# Patient Record
Sex: MALE | Race: WHITE
[De-identification: names, ages, dates, MRNs, and addresses within clinical notes are randomized per-mention and may not be internally consistent; named-entity substitution may affect disease eponyms.]

---

## 2022-05-11 ENCOUNTER — HOSPITAL ENCOUNTER (EMERGENCY)
Dept: HOSPITAL 75 - ER FS | Age: 37
Discharge: LEFT BEFORE DISCHARGE | End: 2022-05-11
Payer: SELF-PAY

## 2022-05-11 VITALS — BODY MASS INDEX: 25.62 KG/M2 | WEIGHT: 182.98 LBS | HEIGHT: 70.87 IN

## 2022-05-11 VITALS — DIASTOLIC BLOOD PRESSURE: 81 MMHG | SYSTOLIC BLOOD PRESSURE: 137 MMHG
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
## 2022-05-11 DIAGNOSIS — F17.210: ICD-10-CM

## 2022-05-11 DIAGNOSIS — R07.89: ICD-10-CM

## 2022-05-11 DIAGNOSIS — T67.5XXA: Primary | ICD-10-CM

## 2022-05-11 LAB
ALBUMIN SERPL-MCNC: 4.7 GM/DL (ref 3.2–4.5)
ALP SERPL-CCNC: 66 U/L (ref 40–136)
ALT SERPL-CCNC: 15 U/L (ref 0–55)
APTT BLD: 27 SEC (ref 24–35)
BASOPHILS # BLD AUTO: 0.1 10^3/UL (ref 0–0.1)
BASOPHILS NFR BLD AUTO: 1 % (ref 0–10)
BILIRUB SERPL-MCNC: 0.6 MG/DL (ref 0.1–1)
BUN/CREAT SERPL: 22
CALCIUM SERPL-MCNC: 9.8 MG/DL (ref 8.5–10.1)
CHLORIDE SERPL-SCNC: 100 MMOL/L (ref 98–107)
CO2 SERPL-SCNC: 21 MMOL/L (ref 21–32)
CREAT SERPL-MCNC: 1.03 MG/DL (ref 0.6–1.3)
EOSINOPHIL # BLD AUTO: 0.2 10^3/UL (ref 0–0.3)
EOSINOPHIL NFR BLD AUTO: 2 % (ref 0–10)
GFR SERPLBLD BASED ON 1.73 SQ M-ARVRAT: 97 ML/MIN
GLUCOSE SERPL-MCNC: 103 MG/DL (ref 70–105)
HCT VFR BLD CALC: 43 % (ref 40–54)
HGB BLD-MCNC: 15.4 G/DL (ref 13.3–17.7)
INR PPP: 1 (ref 0.8–1.4)
LIPASE SERPL-CCNC: 19 U/L (ref 8–78)
LYMPHOCYTES # BLD AUTO: 1.8 10^3/UL (ref 1–4)
LYMPHOCYTES NFR BLD AUTO: 22 % (ref 12–44)
MAGNESIUM SERPL-MCNC: 1.7 MG/DL (ref 1.6–2.4)
MANUAL DIFFERENTIAL PERFORMED BLD QL: NO
MCH RBC QN AUTO: 30 PG (ref 25–34)
MCHC RBC AUTO-ENTMCNC: 36 G/DL (ref 32–36)
MCV RBC AUTO: 85 FL (ref 80–99)
MONOCYTES # BLD AUTO: 0.7 10^3/UL (ref 0–1)
MONOCYTES NFR BLD AUTO: 8 % (ref 0–12)
NEUTROPHILS # BLD AUTO: 5.4 10^3/UL (ref 1.8–7.8)
NEUTROPHILS NFR BLD AUTO: 67 % (ref 42–75)
PLATELET # BLD: 293 10^3/UL (ref 130–400)
PMV BLD AUTO: 9.5 FL (ref 9–12.2)
POTASSIUM SERPL-SCNC: 3.7 MMOL/L (ref 3.6–5)
PROT SERPL-MCNC: 7.2 GM/DL (ref 6.4–8.2)
PROTHROMBIN TIME: 13.2 SEC (ref 12.2–14.7)
SODIUM SERPL-SCNC: 135 MMOL/L (ref 135–145)
WBC # BLD AUTO: 8.1 10^3/UL (ref 4.3–11)

## 2022-05-11 PROCEDURE — 93041 RHYTHM ECG TRACING: CPT

## 2022-05-11 PROCEDURE — 80053 COMPREHEN METABOLIC PANEL: CPT

## 2022-05-11 PROCEDURE — 85730 THROMBOPLASTIN TIME PARTIAL: CPT

## 2022-05-11 PROCEDURE — 85610 PROTHROMBIN TIME: CPT

## 2022-05-11 PROCEDURE — 84484 ASSAY OF TROPONIN QUANT: CPT

## 2022-05-11 PROCEDURE — 83735 ASSAY OF MAGNESIUM: CPT

## 2022-05-11 PROCEDURE — 93005 ELECTROCARDIOGRAM TRACING: CPT

## 2022-05-11 PROCEDURE — 36415 COLL VENOUS BLD VENIPUNCTURE: CPT

## 2022-05-11 PROCEDURE — 83690 ASSAY OF LIPASE: CPT

## 2022-05-11 PROCEDURE — 83874 ASSAY OF MYOGLOBIN: CPT

## 2022-05-11 PROCEDURE — 85025 COMPLETE CBC W/AUTO DIFF WBC: CPT

## 2022-05-11 PROCEDURE — 71045 X-RAY EXAM CHEST 1 VIEW: CPT

## 2022-05-11 NOTE — DIAGNOSTIC IMAGING REPORT
EXAMINATION: Chest 1 view



HISTORY: Chest pain. Heat exhaustion.



COMPARISON: None available.



FINDINGS: 



The lung volumes are normal. No focal consolidation is seen. No

large pleural effusion or pneumothorax is seen. The

cardiomediastinal silhouette is normal in size and contour. No

acute osseous abnormality is seen.



IMPRESSION: 



1. No acute pleuroparenchymal process.



Dictated by: 



  Dictated on workstation # [id number]

## 2022-05-11 NOTE — ED CHEST PAIN
General


Stated Complaint:  CHEST PAIN; HEAT EXPOSURE


Source:  patient


Exam Limitations:  no limitations





History of Present Illness


Date Seen by Provider:  May 11, 2022


Time Seen by Provider:  12:10


Initial Comments


Patient presents ER by private conveyance with significant other chief complaint

that he was at work outside today and started getting some belly pain nausea 

sweating and for a bottle water and himself because he thought he was having 

heat exhaustion.  He is never had that before.  He is never had any heart 

disease.  He does have hypertension but does not take any medicines for it nor 

follow-up with a doctor.  He is a daily smoker and uses some cannabis and states

he occasionally uses stimulants.  He has not urinated much today.  He does not 

have a history of GERD but he does have a pain in his chest pointing to his 

epigastric region burns and radiates upwards and he was starting to have some 

tingling in his left arm and hand.  No primary family early onset coronary 

disease.  No diabetes or hyperlipidemia.





Allergies and Home Medications


Allergies


Coded Allergies:  


     No Known Drug Allergies (Unverified , 22)





Patient Home Medication List


Home Medication List Reviewed:  Yes





Review of Systems


Review of Systems


Constitutional:  No chills, No diaphoresis


EENTM:  No Blurred Vision, No Double Vision


Respiratory:  Denies Cough, Denies Shortness of Air


Cardiovascular:  See HPI, Chest Pain; Denies Edema; Lightheadedness


Gastrointestinal:  See HPI; Denies Abdomen Distended; Abdominal Pain; Denies 

Constipated, Denies Diarrhea; Nausea; Denies Poor Fluid Intake, Denies Vomiting


Genitourinary:  Denies Burning, Denies Discharge


Musculoskeletal:  No back pain, No joint pain





All Other Systems Reviewed


Negative Unless Noted:  Yes





Past Medical-Social-Family Hx


Patient Social History


Tobacco Use?:  Yes


Tobacco type used:  Cigarettes


Use of E-Cig and/or Vaping dev:  No


Substance use?:  Yes





Physical Exam


Vital Signs





Vital Signs - First Documented








 22





 12:13


 


Temp 36.7


 


Pulse 83


 


Resp 20


 


B/P (MAP) 146/72 (96)


 


Pulse Ox 100


 


O2 Delivery Room Air





Capillary Refill :


Height, Weight, BMI


Height: '"


Weight: lbs. oz. kg;  BMI


Method:


General Appearance:  WD/WN, Mild Distress


HEENT:  PERRL/EOMI, Normal ENT Inspection; No Moist Mucous Membranes (Dry oral 

mucosa without lesion or)


Neck:  Full Range of Motion, Normal Inspection, Non Tender


Respiratory:  Lungs Clear, Normal Breath Sounds, No Accessory Muscle Use, No 

Respiratory Distress


Cardiovascular:  Regular Rate, Rhythm, No Edema, Normal Peripheral Pulses


Gastrointestinal:  Normal Bowel Sounds (Quiescent), No Organomegaly, Non Tender,

Soft


Extremity:  Normal Capillary Refill, Normal Inspection, Normal Range of Motion, 

Non Tender


Neurologic/Psychiatric:  Alert, Oriented x3


Skin:  Rash (Erythematous blanchable skin, warm and dry)





Progress/Results/Core Measures


Results/Orders


Lab Results





Laboratory Tests








Test


 22


12:18 Range/Units


 


 


White Blood Count


 8.1 


 4.3-11.0


10^3/uL


 


Red Blood Count


 5.06 


 4.30-5.52


10^6/uL


 


Hemoglobin 15.4  13.3-17.7  g/dL


 


Hematocrit 43  40-54  %


 


Mean Corpuscular Volume 85  80-99  fL


 


Mean Corpuscular Hemoglobin 30  25-34  pg


 


Mean Corpuscular Hemoglobin


Concent 36 


 32-36  g/dL





 


Red Cell Distribution Width 11.9  10.0-14.5  %


 


Platelet Count


 293 


 130-400


10^3/uL


 


Mean Platelet Volume 9.5  9.0-12.2  fL


 


Immature Granulocyte % (Auto) 0   %


 


Neutrophils (%) (Auto) 67  42-75  %


 


Lymphocytes (%) (Auto) 22  12-44  %


 


Monocytes (%) (Auto) 8  0-12  %


 


Eosinophils (%) (Auto) 2  0-10  %


 


Basophils (%) (Auto) 1  0-10  %


 


Neutrophils # (Auto)


 5.4 


 1.8-7.8


10^3/uL


 


Lymphocytes # (Auto)


 1.8 


 1.0-4.0


10^3/uL


 


Monocytes # (Auto)


 0.7 


 0.0-1.0


10^3/uL


 


Eosinophils # (Auto)


 0.2 


 0.0-0.3


10^3/uL


 


Basophils # (Auto)


 0.1 


 0.0-0.1


10^3/uL


 


Immature Granulocyte # (Auto)


 0.0 


 0.0-0.1


10^3/uL


 


Prothrombin Time 13.2  12.2-14.7  SEC


 


INR Comment 1.0  0.8-1.4  


 


Activated Partial


Thromboplast Time 27 


 24-35  SEC





 


Sodium Level 135  135-145  MMOL/L


 


Potassium Level 3.7  3.6-5.0  MMOL/L


 


Chloride Level 100    MMOL/L


 


Carbon Dioxide Level 21  21-32  MMOL/L


 


Anion Gap 14  5-14  MMOL/L


 


Blood Urea Nitrogen 23 H 7-18  MG/DL


 


Creatinine


 1.03 


 0.60-1.30


MG/DL


 


Estimat Glomerular Filtration


Rate 97 


  





 


BUN/Creatinine Ratio 22   


 


Glucose Level 103    MG/DL


 


Calcium Level 9.8  8.5-10.1  MG/DL


 


Corrected Calcium   8.5-10.1  MG/DL


 


Magnesium Level 1.7  1.6-2.4  MG/DL


 


Total Bilirubin 0.6  0.1-1.0  MG/DL


 


Aspartate Amino Transf


(AST/SGOT) 16 


 5-34  U/L





 


Alanine Aminotransferase


(ALT/SGPT) 15 


 0-55  U/L





 


Alkaline Phosphatase 66    U/L


 


Myoglobin


 38.4 


 10.0-92.0


NG/ML


 


Troponin I < 0.30  <0.30  NG/ML


 


Total Protein 7.2  6.4-8.2  GM/DL


 


Albumin 4.7 H 3.2-4.5  GM/DL


 


Lipase 19  8-78  U/L








My Orders





Orders - YFN DWYER


Cbc With Automated Diff (22 12:16)


Magnesium (22 12:16)


Chest 1 View Ap/Pa Only (22 12:16)


Ekg Tracing (22 12:16)


Comprehensive Metabolic Panel (22 12:16)


Myoglobin Serum (22 12:16)


Protime With Inr (22 12:16)


Partial Thromboplastin Time (22 12:16)


O2 (22 12:16)


Monitor-Rhythm Ecg Trace Only (22 12:16)


Lipid Panel (22 06:00)


Aspirin Chewable Tablet (Baby Aspirin Ch (22 12:30)


Ed Iv/Invasive Line Start (22 12:16)


Lipase (22 12:16)


Troponin I Fs (22 12:16)


Ed Iv/Invasive Line Start (22 12:16)


Lactated Ringers (Lr 1000 Ml Iv Solution (22 12:30)


Lactated Ringers (Lr 1000 Ml Iv Solution (22 12:30)


Pantoprazole Injection (Protonix Injecti (22 12:30)


Ondansetron Injection (Zofran Injectio (22 12:30)





Medications Given in ED





Current Medications








 Medications  Dose


 Ordered  Sig/Renetta


 Route  Start Time


 Stop Time Status Last Admin


Dose Admin


 


 Aspirin  324 mg  ONCE  ONCE


 PO  22 12:30


 22 12:31 DC 22 12:24


324 MG


 


 Lactated Ringer's  1,000 ml @ 


 0 mls/hr  Q0M ONCE


 IV  22 12:30


 22 12:31 DC 22 12:24


999 MLS/HR


 


 Lactated Ringer's  1,000 ml @ 


 0 mls/hr  Q0M ONCE


 IV  22 12:30


 22 12:31 DC 22 13:25


1,000 MLS/HR


 


 Ondansetron HCl  8 mg  ONCE  ONCE


 IVP  22 12:30


 22 12:31 DC 22 12:24


8 MG


 


 Pantoprazole  40 mg  ONCE  ONCE


 IV  22 12:30


 22 12:31 DC 22 12:24


40 MG








Vital Signs/I&O











 22





 12:13


 


Temp 36.7


 


Pulse 83


 


Resp 20


 


B/P (MAP) 146/72 (96)


 


Pulse Ox 100


 


O2 Delivery Room Air











Progress


Progress Note #1:  


   Time:  12:21


Progress Note


Despite pouring water on himself he has no sweat on his forehead, arms or 

elsewhere.  He does look like he had a heat injury.  He is neurologically 

intact.  His vital signs are okay with a heart rate in the 60s and a good blood 

pressure of 146/72 on presentation.  He is not having any difficulty breathing. 

We will give him some aspirin and work him up for his heart however we will 

continue to treat his evident heat exhaustion with a couple liters of fluid.  

Chest x-ray.  Electrolytes and labs.  Pantoprazole for his burning sensation and

ondansetron for his nausea.  We will try GI cocktail after his nausea is under 

control.


Progress Note #2:  


   Time:  13:17


Progress Note


After the pantoprazole he says his burning pain is gone.  His nausea is gone and

he is feeling much better.  Again we will let him have another liter of fluids 

as he has not even had the urge to urinate yet.  Will encourage him to follow-up

with cardiology outpatient in a couple weeks if his delta troponin is negative.


1 points HEART Pathway Score. Low risk; 0.9-1.7% 30-day MACE


Progress Note #3:  


   Time:  13:58


Progress Note


Patient states his symptoms are gone he is feeling much better and he would like

to go home.  He was explained that we would not be able to rule out significant 

coronary disease without a second troponin but he does not want to wait any 

longer.  He was taken down to the bathroom to provide a urine specimen for drug 

screen for his work and came back stating he declined to do this and they will 

just wants to go home.  We did discuss risks benefits and alternatives to 

leaving at this time and encouraged him to follow-up with a cardiologist for an 

outpatient work-up.  We provided him with an opportunity for some nausea medici

corrie to be sent to the pharmacy which he declined.  He has gotten his total 2 L 

of fluids.  We did reinforce the plan for him to take it easy for the next 

couple days and drink plenty of fluids.  Return precautions were discussed.


Initial ECG Impression Date:  May 11, 2022


Initial ECG Impression Time:  12:14


Initial ECG Rate:  74


Initial ECG Rhythm:  Normal Sinus


Initial ECG Intervals:  Normal


Initial ECG Impression:  Normal


Initial ECG Comparisson:  No Previous ECG Available


Comment


Normal sinus rhythm without clinically relevant ST elevation or depression





Diagnostic Imaging





   Diagonstic Imaging:  Xray


   Plain Films/CT/US/NM/MRI:  chest


Comments


                 [facility] [place], [place]





NAME:   OLENA VANG


[facility] REC#:   [id number]


ACCOUNT#:   [id number]


PT STATUS:   REG ER


:   1985


PHYSICIAN:   YFN DWYER MD


ADMIT DATE:   22/ER FS


                                  ***Signed***


Date of Exam:22





CHEST 1 VIEW AP/PA ONLY








EXAMINATION: Chest 1 view





HISTORY: Chest pain. Heat exhaustion.





COMPARISON: None available.





FINDINGS: 





The lung volumes are normal. No focal consolidation is seen. No


large pleural effusion or pneumothorax is seen. The


cardiomediastinal silhouette is normal in size and contour. No


acute osseous abnormality is seen.





IMPRESSION: 





1. No acute pleuroparenchymal process.





Dictated by: 





  Dictated on workstation # [id number]








Dict:   22 1244


Trans:   22 1249


 [id number]





Interpreted by:     EM VARMA DO


Electronically signed by: EM VARMA DO 22 1249


   Reviewed:  Reviewed by Me





Departure


Impression





   Primary Impression:  


   Heat exhaustion


   Qualified Codes:  T67.5XXA - Heat exhaustion, unspecified, initial encounter


   Additional Impression:  


   Chest pain


   Qualified Codes:  R07.9 - Chest pain, unspecified


Disposition:  07 AGAINST MEDICAL ADVICE


Condition:  Against Medical Advice





Departure-Patient Inst.


Decision time for Depature:  13:59


Referrals:  


[facility]





NO,LOCAL PHYSICIAN (PCP)


Primary Care Physician


Patient Instructions:  Heat Exhaustion and Heat Stroke (DC)





Add. Discharge Instructions:  


Drink plenty of fluids.  Sports drink such as Gatorade or Powerade are 

recommended.


For the next 2 to 3 days you should stay in and take it easy while your body 

recovers.


Follow-up in 1 to 2 weeks with a cardiologist, Dr. Piedra by calling his clinic 

for an appointment to further evaluate the pain you are feeling in your chest.


Return to the ER at anytime for further management of symptoms especially if 

they get worse or you develop more chest pain, shortness of air, or other 

worrisome symptoms.


Work/School Note:  Work Release Form   Date Seen in the Emergency Department:  

May 11, 2022


   Return to Work:  May 16, 2022


   Restrictions:  No Restrictions





Copy


Copies To 1:   [facility]











YFN DWYER                 May 11, 2022 12:23

## 2022-05-28 ENCOUNTER — HOSPITAL ENCOUNTER (EMERGENCY)
Dept: HOSPITAL 75 - ER | Age: 37
Discharge: HOME | End: 2022-05-28
Payer: SELF-PAY

## 2022-05-28 VITALS — WEIGHT: 179.9 LBS | HEIGHT: 70 IN | BODY MASS INDEX: 25.75 KG/M2

## 2022-05-28 VITALS — DIASTOLIC BLOOD PRESSURE: 93 MMHG | SYSTOLIC BLOOD PRESSURE: 127 MMHG

## 2022-05-28 DIAGNOSIS — R00.0: ICD-10-CM

## 2022-05-28 DIAGNOSIS — R07.89: Primary | ICD-10-CM

## 2022-05-28 DIAGNOSIS — Z87.891: ICD-10-CM

## 2022-05-28 LAB
ALBUMIN SERPL-MCNC: 4.8 GM/DL (ref 3.2–4.5)
ALP SERPL-CCNC: 70 U/L (ref 40–136)
ALT SERPL-CCNC: 26 U/L (ref 0–55)
APTT BLD: 29 SEC (ref 24–35)
BASOPHILS # BLD AUTO: 0.1 10^3/UL (ref 0–0.1)
BASOPHILS NFR BLD AUTO: 1 % (ref 0–10)
BILIRUB SERPL-MCNC: 0.5 MG/DL (ref 0.1–1)
BUN/CREAT SERPL: 17
CALCIUM SERPL-MCNC: 9.9 MG/DL (ref 8.5–10.1)
CHLORIDE SERPL-SCNC: 102 MMOL/L (ref 98–107)
CO2 SERPL-SCNC: 20 MMOL/L (ref 21–32)
CREAT SERPL-MCNC: 1.03 MG/DL (ref 0.6–1.3)
EOSINOPHIL # BLD AUTO: 0.2 10^3/UL (ref 0–0.3)
EOSINOPHIL NFR BLD AUTO: 2 % (ref 0–10)
GFR SERPLBLD BASED ON 1.73 SQ M-ARVRAT: 97 ML/MIN
GLUCOSE SERPL-MCNC: 141 MG/DL (ref 70–105)
HCT VFR BLD CALC: 46 % (ref 40–54)
HGB BLD-MCNC: 16.4 G/DL (ref 13.3–17.7)
INR PPP: 0.9 (ref 0.8–1.4)
LYMPHOCYTES # BLD AUTO: 2.1 10^3/UL (ref 1–4)
LYMPHOCYTES NFR BLD AUTO: 21 % (ref 12–44)
MAGNESIUM SERPL-MCNC: 2.1 MG/DL (ref 1.6–2.4)
MANUAL DIFFERENTIAL PERFORMED BLD QL: NO
MCH RBC QN AUTO: 30 PG (ref 25–34)
MCHC RBC AUTO-ENTMCNC: 36 G/DL (ref 32–36)
MCV RBC AUTO: 86 FL (ref 80–99)
MONOCYTES # BLD AUTO: 0.7 10^3/UL (ref 0–1)
MONOCYTES NFR BLD AUTO: 7 % (ref 0–12)
NEUTROPHILS # BLD AUTO: 7.1 10^3/UL (ref 1.8–7.8)
NEUTROPHILS NFR BLD AUTO: 69 % (ref 42–75)
PLATELET # BLD: 382 10^3/UL (ref 130–400)
PMV BLD AUTO: 9.3 FL (ref 9–12.2)
POTASSIUM SERPL-SCNC: 3.8 MMOL/L (ref 3.6–5)
PROT SERPL-MCNC: 7.8 GM/DL (ref 6.4–8.2)
PROTHROMBIN TIME: 12.9 SEC (ref 12.2–14.7)
SODIUM SERPL-SCNC: 139 MMOL/L (ref 135–145)
WBC # BLD AUTO: 10.3 10^3/UL (ref 4.3–11)

## 2022-05-28 PROCEDURE — 93041 RHYTHM ECG TRACING: CPT

## 2022-05-28 PROCEDURE — 85730 THROMBOPLASTIN TIME PARTIAL: CPT

## 2022-05-28 PROCEDURE — 83874 ASSAY OF MYOGLOBIN: CPT

## 2022-05-28 PROCEDURE — 36415 COLL VENOUS BLD VENIPUNCTURE: CPT

## 2022-05-28 PROCEDURE — 99284 EMERGENCY DEPT VISIT MOD MDM: CPT

## 2022-05-28 PROCEDURE — 85025 COMPLETE CBC W/AUTO DIFF WBC: CPT

## 2022-05-28 PROCEDURE — 85610 PROTHROMBIN TIME: CPT

## 2022-05-28 PROCEDURE — 80320 DRUG SCREEN QUANTALCOHOLS: CPT

## 2022-05-28 PROCEDURE — 80053 COMPREHEN METABOLIC PANEL: CPT

## 2022-05-28 PROCEDURE — 83735 ASSAY OF MAGNESIUM: CPT

## 2022-05-28 PROCEDURE — 84484 ASSAY OF TROPONIN QUANT: CPT

## 2022-05-28 PROCEDURE — 83690 ASSAY OF LIPASE: CPT

## 2022-05-28 PROCEDURE — 71045 X-RAY EXAM CHEST 1 VIEW: CPT

## 2022-05-28 NOTE — ED CHEST PAIN
General


Stated Complaint:  SOB/STOMACH BURNING/JAW PAIN


Source:  patient


Exam Limitations:  no limitations





History of Present Illness


Date Seen by Provider:  May 28, 2022


Time Seen by Provider:  18:57


Initial Comments


Patient is a 37-year-old male who presents ED with chest pain shortness of 

breath and anxiety.  Patient states symptoms started 45 minutes ago while at 

home.  He states he just got off work.  Patient states he snorted "speed" which 

she has taken in the past.  History of amphetamine use.  Patient started having 

sharp chest pain with shortness of breath.  States he feels anxious and having a

panic attack.  Patient was tachycardic on arrival.   reports history of 

hypertension.  History of smoking.  Denies of any nausea, vomiting, abdominal 

pain, fever, visual changes.  Patient states he feels "off".  Patient states he 

bought the drug.  Not sure if the medication is laced with any other stimulants.





Allergies and Home Medications


Allergies


Coded Allergies:  


     No Known Drug Allergies (Unverified , 5/11/22)





Patient Home Medication List


Home Medication List Reviewed:  Yes





Review of Systems


Review of Systems


Constitutional:  No chills, No diaphoresis


EENTM:  No Blurred Vision, No Eye Pain


Respiratory:  Denies Cough, Denies Orthopnea; Shortness of Air


Cardiovascular:  Chest Pain; Denies Edema, Denies Irregular Heart Rate


Gastrointestinal:  Denies Abdominal Pain, Denies Diarrhea, Denies Nausea, Denies

Vomiting


Genitourinary:  Denies Burning, Denies Discharge


Musculoskeletal:  No back pain, No joint pain


Skin:  No change in color, No change in hair/nails


Psychiatric/Neurological:  Anxiety





All Other Systems Reviewed


Negative Unless Noted:  Yes





Physical Exam


Vital Signs





Vital Signs - First Documented








 5/28/22





 19:00


 


Temp 36.4


 


Pulse 105


 


Resp 14


 


B/P (MAP) 159/102 (121)


 


Pulse Ox 99





Capillary Refill :


Height, Weight, BMI


Height: '"


Weight: lbs. oz. kg; 25.00 BMI


Method:


General Appearance:  No Apparent Distress, WD/WN


HEENT:  PERRL/EOMI, TMs Normal, Normal ENT Inspection, Pharynx Normal


Neck:  Full Range of Motion, Normal Inspection, Non Tender, Supple


Respiratory:  Chest Non Tender, Lungs Clear, Normal Breath Sounds, No Accessory 

Muscle Use, No Respiratory Distress


Cardiovascular:  Regular Rate, Rhythm, No Edema, No Gallop, No JVD


Gastrointestinal:  Normal Bowel Sounds, No Organomegaly, No Pulsatile Mass, Non 

Tender


Extremity:  Normal Capillary Refill, Normal Inspection, Normal Range of Motion, 

Non Tender


Neurologic/Psychiatric:  Alert, Oriented x3, No Motor/Sensory Deficits, Normal 

Mood/Affect, CNs II-XII Norm as Tested


Skin:  Normal Color, Warm/Dry





Progress/Results/Core Measures


Results/Orders


Lab Results





Laboratory Tests








Test


 5/28/22


19:03 Range/Units


 


 


White Blood Count


 10.3 


 4.3-11.0


10^3/uL


 


Red Blood Count


 5.39 


 4.30-5.52


10^6/uL


 


Hemoglobin 16.4  13.3-17.7  g/dL


 


Hematocrit 46  40-54  %


 


Mean Corpuscular Volume 86  80-99  fL


 


Mean Corpuscular Hemoglobin 30  25-34  pg


 


Mean Corpuscular Hemoglobin


Concent 36 


 32-36  g/dL





 


Red Cell Distribution Width 11.8  10.0-14.5  %


 


Platelet Count


 382 


 130-400


10^3/uL


 


Mean Platelet Volume 9.3  9.0-12.2  fL


 


Immature Granulocyte % (Auto) 1   %


 


Neutrophils (%) (Auto) 69  42-75  %


 


Lymphocytes (%) (Auto) 21  12-44  %


 


Monocytes (%) (Auto) 7  0-12  %


 


Eosinophils (%) (Auto) 2  0-10  %


 


Basophils (%) (Auto) 1  0-10  %


 


Neutrophils # (Auto)


 7.1 


 1.8-7.8


10^3/uL


 


Lymphocytes # (Auto)


 2.1 


 1.0-4.0


10^3/uL


 


Monocytes # (Auto)


 0.7 


 0.0-1.0


10^3/uL


 


Eosinophils # (Auto)


 0.2 


 0.0-0.3


10^3/uL


 


Basophils # (Auto)


 0.1 


 0.0-0.1


10^3/uL


 


Immature Granulocyte # (Auto)


 0.1 


 0.0-0.1


10^3/uL


 


Prothrombin Time 12.9  12.2-14.7  SEC


 


INR Comment 0.9  0.8-1.4  


 


Activated Partial


Thromboplast Time 29 


 24-35  SEC





 


Sodium Level 139  135-145  MMOL/L


 


Potassium Level 3.8  3.6-5.0  MMOL/L


 


Chloride Level 102    MMOL/L


 


Carbon Dioxide Level 20 L 21-32  MMOL/L


 


Anion Gap 17 H 5-14  MMOL/L


 


Blood Urea Nitrogen 17  7-18  MG/DL


 


Creatinine


 1.03 


 0.60-1.30


MG/DL


 


Estimat Glomerular Filtration


Rate 97 


  





 


BUN/Creatinine Ratio 17   


 


Glucose Level 141 H   MG/DL


 


Calcium Level 9.9  8.5-10.1  MG/DL


 


Corrected Calcium   8.5-10.1  MG/DL


 


Magnesium Level 2.1  1.6-2.4  MG/DL


 


Total Bilirubin 0.5  0.1-1.0  MG/DL


 


Aspartate Amino Transf


(AST/SGOT) 19 


 5-34  U/L





 


Alanine Aminotransferase


(ALT/SGPT) 26 


 0-55  U/L





 


Alkaline Phosphatase 70    U/L


 


Myoglobin


 39.2 


 10.0-92.0


NG/ML


 


Troponin I < 0.028  <0.028  NG/ML


 


Total Protein 7.8  6.4-8.2  GM/DL


 


Albumin 4.8 H 3.2-4.5  GM/DL


 


Lipase 17  8-78  U/L


 


Serum Alcohol < 10  <10  MG/DL








My Orders





Orders - NII MENCHACA PA


Ekg Tracing (5/28/22 18:46)


Cbc With Automated Diff (5/28/22 18:54)


Magnesium (5/28/22 18:54)


Chest 1 View, Ap/Pa Only (5/28/22 18:54)


Ekg Tracing (5/28/22 18:54)


Comprehensive Metabolic Panel (5/28/22 18:54)


Myoglobin Serum (5/28/22 18:54)


Protime With Inr (5/28/22 18:54)


Partial Thromboplastin Time (5/28/22 18:54)


Monitor-Rhythm Ecg Trace Only (5/28/22 18:54)


Ed Iv/Invasive Line Start (5/28/22 18:54)


Troponin I Camila (5/28/22 18:54)


Lorazepam Injection (Ativan Injection) (5/28/22 19:00)


Alcohol (5/28/22 18:55)


Ns Iv 1000 Ml (Sodium Chloride 0.9%) (5/28/22 18:56)


Lorazepam Injection (Ativan Injection) (5/28/22 20:00)


Lipase (5/28/22 20:20)





Medications Given in ED





Current Medications








 Medications  Dose


 Ordered  Sig/Renetta


 Route  Start Time


 Stop Time Status Last Admin


Dose Admin


 


 Lorazepam  1 mg  ONCE  ONCE


 IVP  5/28/22 19:00


 5/28/22 19:01 DC 5/28/22 19:06


1 MG


 


 Lorazepam  1 mg  ONCE  ONCE


 IVP  5/28/22 20:00


 5/28/22 20:01 DC 5/28/22 20:16


1 MG








Vital Signs/I&O











 5/28/22





 19:00


 


Temp 36.4


 


Pulse 105


 


Resp 14


 


B/P (MAP) 159/102 (121)


 


Pulse Ox 99











Departure


Communication (PCP)


Patient presents ED with chest pain after taking "speed".  Has taken speed in 

the past.  Patient was tachycardic.  States he feels anxious. Patient was given 

2 mg of Ativan with improvement of his symptoms.  EKG showed sinus tachycardia 

at 103 bpm, QRS duration of 93 MS, QTc 381 MS.  Normal troponin.  Chest x-ray 

negative for acute abnormality.  Lab work was otherwise unremarkable.  Patient 

would not provide urine sample.  After observation patient was requesting to be 

discharged.  Denies of any other drug use such as cocaine.  Discussed the risk 

of drug use and complications.  Patient states he has used speed and snorts.  

Denies of any IV drug use.  Patient is currently asymptomatic.  Patient refusing

to stay any longer and requesting his discharge paperwork.  Provided outpatient 

follow-up.





Impression





   Primary Impression:  


   Chest pain


Disposition:  01 HOME, SELF-CARE


Condition:  Stable





Departure-Patient Inst.


Decision time for Depature:  20:54


Referrals:  


NO,LOCAL PHYSICIAN (PCP/Family)


Primary Care Physician


Patient Instructions:  Chest Pain (DC)











NII MENCHACA          May 28, 2022 18:59

## 2022-05-28 NOTE — DIAGNOSTIC IMAGING REPORT
CHEST 1 VIEW, AP/PA ONLY



Indication: Chest pain. 



Comparison: 05/11/2022



Findings:

No focal airspace disease in the visualized lungs. Please note

that the posterior lower lobes are poorly evaluated by portable

radiography. No pleural effusion or pneumothorax. Normal

cardiomediastinal silhouette.



Impression: 

1. No acute cardiopulmonary process by portable radiography.



Dictated by: 



  Dictated on workstation # [id number]

## 2022-06-09 ENCOUNTER — HOSPITAL ENCOUNTER (EMERGENCY)
Dept: HOSPITAL 75 - ER | Age: 37
Discharge: HOME | End: 2022-06-09
Payer: SELF-PAY

## 2022-06-09 VITALS — DIASTOLIC BLOOD PRESSURE: 98 MMHG | SYSTOLIC BLOOD PRESSURE: 152 MMHG

## 2022-06-09 VITALS — HEIGHT: 69.69 IN | BODY MASS INDEX: 27.13 KG/M2 | WEIGHT: 187.39 LBS

## 2022-06-09 DIAGNOSIS — F17.210: ICD-10-CM

## 2022-06-09 DIAGNOSIS — R07.2: Primary | ICD-10-CM

## 2022-06-09 LAB
ALBUMIN SERPL-MCNC: 4.4 GM/DL (ref 3.2–4.5)
ALP SERPL-CCNC: 73 U/L (ref 40–136)
ALT SERPL-CCNC: 42 U/L (ref 0–55)
APTT BLD: 28 SEC (ref 24–35)
BASOPHILS # BLD AUTO: 0 10^3/UL (ref 0–0.1)
BASOPHILS NFR BLD AUTO: 0 % (ref 0–10)
BILIRUB SERPL-MCNC: 0.5 MG/DL (ref 0.1–1)
BUN/CREAT SERPL: 12
CALCIUM SERPL-MCNC: 9.5 MG/DL (ref 8.5–10.1)
CHLORIDE SERPL-SCNC: 105 MMOL/L (ref 98–107)
CO2 SERPL-SCNC: 22 MMOL/L (ref 21–32)
CREAT SERPL-MCNC: 1 MG/DL (ref 0.6–1.3)
EOSINOPHIL # BLD AUTO: 0.1 10^3/UL (ref 0–0.3)
EOSINOPHIL NFR BLD AUTO: 1 % (ref 0–10)
GFR SERPLBLD BASED ON 1.73 SQ M-ARVRAT: 101 ML/MIN
GLUCOSE SERPL-MCNC: 108 MG/DL (ref 70–105)
HCT VFR BLD CALC: 43 % (ref 40–54)
HGB BLD-MCNC: 14.9 G/DL (ref 13.3–17.7)
INR PPP: 1 (ref 0.8–1.4)
LIPASE SERPL-CCNC: 19 U/L (ref 8–78)
LYMPHOCYTES # BLD AUTO: 1.5 10^3/UL (ref 1–4)
LYMPHOCYTES NFR BLD AUTO: 16 % (ref 12–44)
MAGNESIUM SERPL-MCNC: 2 MG/DL (ref 1.6–2.4)
MANUAL DIFFERENTIAL PERFORMED BLD QL: NO
MCH RBC QN AUTO: 31 PG (ref 25–34)
MCHC RBC AUTO-ENTMCNC: 35 G/DL (ref 32–36)
MCV RBC AUTO: 88 FL (ref 80–99)
MONOCYTES # BLD AUTO: 0.7 10^3/UL (ref 0–1)
MONOCYTES NFR BLD AUTO: 8 % (ref 0–12)
NEUTROPHILS # BLD AUTO: 7.1 10^3/UL (ref 1.8–7.8)
NEUTROPHILS NFR BLD AUTO: 75 % (ref 42–75)
PLATELET # BLD: 304 10^3/UL (ref 130–400)
PMV BLD AUTO: 9.6 FL (ref 9–12.2)
POTASSIUM SERPL-SCNC: 3.8 MMOL/L (ref 3.6–5)
PROT SERPL-MCNC: 7 GM/DL (ref 6.4–8.2)
PROTHROMBIN TIME: 13.2 SEC (ref 12.2–14.7)
SODIUM SERPL-SCNC: 140 MMOL/L (ref 135–145)
WBC # BLD AUTO: 9.4 10^3/UL (ref 4.3–11)

## 2022-06-09 PROCEDURE — 85025 COMPLETE CBC W/AUTO DIFF WBC: CPT

## 2022-06-09 PROCEDURE — 83690 ASSAY OF LIPASE: CPT

## 2022-06-09 PROCEDURE — 85610 PROTHROMBIN TIME: CPT

## 2022-06-09 PROCEDURE — 85730 THROMBOPLASTIN TIME PARTIAL: CPT

## 2022-06-09 PROCEDURE — 36415 COLL VENOUS BLD VENIPUNCTURE: CPT

## 2022-06-09 PROCEDURE — 83874 ASSAY OF MYOGLOBIN: CPT

## 2022-06-09 PROCEDURE — 93005 ELECTROCARDIOGRAM TRACING: CPT

## 2022-06-09 PROCEDURE — 71045 X-RAY EXAM CHEST 1 VIEW: CPT

## 2022-06-09 PROCEDURE — 84484 ASSAY OF TROPONIN QUANT: CPT

## 2022-06-09 PROCEDURE — 83735 ASSAY OF MAGNESIUM: CPT

## 2022-06-09 PROCEDURE — 80053 COMPREHEN METABOLIC PANEL: CPT

## 2022-06-09 PROCEDURE — 83880 ASSAY OF NATRIURETIC PEPTIDE: CPT

## 2022-06-09 PROCEDURE — 93041 RHYTHM ECG TRACING: CPT

## 2022-06-09 NOTE — ED CHEST PAIN
General


Chief Complaint:  Respiratory Problems


Stated Complaint:  CHEST PAIN


Nursing Triage Note:  


PT ARRIVAL TO ER VIA EMS WITH COMPLAINT OF CHEST PAIN AFTER SMOKING METH FROM A 


LIGHT BULB. PT STATES THAT HE HAS NEVER FELT THIS WAY AFTER METH USE. PT STATES 


THAT PAIN GOES UP INTO LEFT JAW. PATIENT TOOK 4 BABY ASPIRIN PRIOR TO EMS BEING 


CALLED. PT GIVEN 1 NITRO BY EMS WITH IMPROVEMENT.


Source:  patient


Exam Limitations:  no limitations


 (NII MENCHACA)





History of Present Illness


Date Seen by Provider:  Jun 9, 2022


Time Seen by Provider:  20:49


Initial Comments


Patient is a 37-year-old male who presents ED with chest pain.  Chest pain is 

substernal described as pressure.  Started about an hour and a half ago.  

Patient states he was putting a cover on a mattress.  Pain started intensifying 

radiating to left shoulder and left-sided neck.  Started sweating.  Patient 

states no history of similar type pain in the past.  Did use methamphetamine 

about 5 hours ago.  He reports history of meth use but not daily.  Has a history

of smoking.  Denies any of any other drug use.  Denies history of hypertension, 

diabetes, high cholesterol.  Family cardiac history.  No recent travels or 

surgeries.  Denies any leg swelling.  Took 324 aspirin at home and was given 

sublingual nitro by EMS with improvement of pain.  Rates pain 2 out of 10 at 

this time.  Denies abdominal pain, vomiting, diarrhea, headache, dizziness, 

visual changes


 (NII MENCHACA)





Allergies and Home Medications


Allergies


Coded Allergies:  


     No Known Drug Allergies (Unverified , 5/11/22)





Patient Home Medication List


Home Medication List Reviewed:  Yes


 (NII MENCHACA)





Review of Systems


Review of Systems


Constitutional:  No chills, No diaphoresis, No malaise, No weakness


EENTM:  No Blurred Vision, No Eye Pain


Respiratory:  Denies Cough, Denies Orthopnea; Shortness of Air


Cardiovascular:  Chest Pain


Gastrointestinal:  Denies Diarrhea; Nausea; Denies Vomiting


Musculoskeletal:  No back pain, No joint pain


Skin:  No change in color, No change in hair/nails (NII MENCHACA)





All Other Systems Reviewed


Negative Unless Noted:  Yes


 (NII MENCHACA)





Past Medical-Social-Family Hx


Patient Social History


Tobacco Use?:  Yes


Tobacco type used:  Cigarettes


Smoking Status:  Current Everyday Smoker


Use of E-Cig and/or Vaping dev:  No


Substance use?:  Yes


Substance type:  Methamphetamine


Substance frequency:  Several times a month


Alcohol Use?:  Yes


Alcohol type:  Hard Liquor


Alcohol Frequency:  Several times a month


Pt feels they are or have been:  No


 (NII MENCHACA)





Physical Exam


Vital Signs





Vital Signs - First Documented








 6/9/22





 20:34


 


Temp 36.4


 


Pulse 90


 


Resp 16


 


B/P (MAP) 156/98 (117)


 


Pulse Ox 99


 


O2 Delivery Room Air








 (RYANN LUKE MD)


Vital Signs


Capillary Refill : Less Than 3 Seconds 


 (NII MENCHACA)


Height, Weight, BMI


Height: '"


Weight: lbs. oz. kg; 27.00 BMI


Method:


General Appearance:  No Apparent Distress, WD/WN


HEENT:  PERRL/EOMI, TMs Normal, Normal ENT Inspection, Pharynx Normal


Neck:  Full Range of Motion, Normal Inspection, Non Tender, Supple


Respiratory:  Chest Non Tender, Lungs Clear, Normal Breath Sounds, No Accessory 

Muscle Use, No Respiratory Distress


Cardiovascular:  Regular Rate, Rhythm, No Edema


Gastrointestinal:  Normal Bowel Sounds, No Organomegaly, No Pulsatile Mass, Non 

Tender


Extremity:  Normal Capillary Refill, Normal Inspection, Normal Range of Motion, 

Non Tender, No Calf Tenderness


Neurologic/Psychiatric:  Alert, Oriented x3, No Motor/Sensory Deficits, Normal 

Mood/Affect, CNs II-XII Norm as Tested


Skin:  Normal Color, Warm/Dry (NII MENCHACA)





Progress/Results/Core Measures


Results/Orders


Lab Results





Laboratory Tests








Test


 6/9/22


20:31 Range/Units


 


 


White Blood Count


 9.4 


 4.3-11.0


10^3/uL


 


Red Blood Count


 4.89 


 4.30-5.52


10^6/uL


 


Hemoglobin 14.9  13.3-17.7  g/dL


 


Hematocrit 43  40-54  %


 


Mean Corpuscular Volume 88  80-99  fL


 


Mean Corpuscular Hemoglobin 31  25-34  pg


 


Mean Corpuscular Hemoglobin


Concent 35 


 32-36  g/dL





 


Red Cell Distribution Width 12.1  10.0-14.5  %


 


Platelet Count


 304 


 130-400


10^3/uL


 


Mean Platelet Volume 9.6  9.0-12.2  fL


 


Immature Granulocyte % (Auto) 0   %


 


Neutrophils (%) (Auto) 75  42-75  %


 


Lymphocytes (%) (Auto) 16  12-44  %


 


Monocytes (%) (Auto) 8  0-12  %


 


Eosinophils (%) (Auto) 1  0-10  %


 


Basophils (%) (Auto) 0  0-10  %


 


Neutrophils # (Auto)


 7.1 


 1.8-7.8


10^3/uL


 


Lymphocytes # (Auto)


 1.5 


 1.0-4.0


10^3/uL


 


Monocytes # (Auto)


 0.7 


 0.0-1.0


10^3/uL


 


Eosinophils # (Auto)


 0.1 


 0.0-0.3


10^3/uL


 


Basophils # (Auto)


 0.0 


 0.0-0.1


10^3/uL


 


Immature Granulocyte # (Auto)


 0.0 


 0.0-0.1


10^3/uL


 


Prothrombin Time 13.2  12.2-14.7  SEC


 


INR Comment 1.0  0.8-1.4  


 


Activated Partial


Thromboplast Time 28 


 24-35  SEC





 


Sodium Level 140  135-145  MMOL/L


 


Potassium Level 3.8  3.6-5.0  MMOL/L


 


Chloride Level 105    MMOL/L


 


Carbon Dioxide Level 22  21-32  MMOL/L


 


Anion Gap 13  5-14  MMOL/L


 


Blood Urea Nitrogen 12  7-18  MG/DL


 


Creatinine


 1.00 


 0.60-1.30


MG/DL


 


Estimat Glomerular Filtration


Rate 101 


  





 


BUN/Creatinine Ratio 12   


 


Glucose Level 108 H   MG/DL


 


Calcium Level 9.5  8.5-10.1  MG/DL


 


Corrected Calcium 9.2  8.5-10.1  MG/DL


 


Magnesium Level 2.0  1.6-2.4  MG/DL


 


Total Bilirubin 0.5  0.1-1.0  MG/DL


 


Aspartate Amino Transf


(AST/SGOT) 68 H


 5-34  U/L





 


Alanine Aminotransferase


(ALT/SGPT) 42 


 0-55  U/L





 


Alkaline Phosphatase 73    U/L


 


Myoglobin


 129.9 H


 10.0-92.0


NG/ML


 


Troponin I < 0.028  <0.028  NG/ML


 


B-Type Natriuretic Peptide 12.3  <100.0  PG/ML


 


Total Protein 7.0  6.4-8.2  GM/DL


 


Albumin 4.4  3.2-4.5  GM/DL


 


Lipase 19  8-78  U/L





 RYANN EDWARDS MD)


My Orders





Orders - RYANN LUKE MD


Ekg Tracing (6/9/22 20:31)


 (RYANN LUKE MD)


Medications Given in ED





Current Medications








 Medications  Dose


 Ordered  Sig/Renetta


 Route  Start Time


 Stop Time Status Last Admin


Dose Admin


 


 Lorazepam  1 mg  ONCE  ONCE


 IVP  6/9/22 21:00


 6/9/22 21:01 DC 6/9/22 20:59


1 MG





 (RYANN LUKE MD)


Vital Signs/I&O











 6/9/22 6/9/22





 20:34 22:50


 


Temp 36.4 


 


Pulse 90 93


 


Resp 16 18


 


B/P (MAP) 156/98 (117) 152/98


 


Pulse Ox 99 98


 


O2 Delivery Room Air Room Air





 (RYANN LUKE MD)








Blood Pressure Mean:                    117











Departure


Communication (PCP)


Patient is a 37-year-old male presents ED with chest pain.  This occurred 1 hour

before arrival.  Chest pain was rated 2 out of 10.  Patient Was given sublingual

nitro  by EMS and took full aspirin at home.  No known history of coronary 

artery disease.  Reports methamphetamine use 5 hours prior.  He states he was 

putting on sheets on a mattress.  Patient EKG showed normal sinus rhythm.  

Patient was given a dose of Ativan for the continued chest tightness.  Patient 

refused to provide any urine sample.  Patient had a slight increase in myoglobin

but normal troponin.  Normal BNP.  Chest x-ray negative for pneumonia, 

mediastinal widening, pleural effusion, pneumothorax.  Lab work was otherwise 

unremarkable.  Patient pain resolved here.  Patient has a heart score of 1.  

Recommend serial troponin due to the recent chest pain.  Patient refused staying

and requesting to be discharge.  Discussed the risk as not able to rule out 

severity of coronary artery disease.  Patient acknowledges.  Recommend 

outpatient follow-up with primary care physician.  Provided cardiology 

outpatient follow-up for this continued pain.  He reports history of 

palpitations.  Would likely benefit with Holter monitor or further evaluation 

with echocardiogram.  Patient acknowledges.


 (NII MENCHACA)





Impression





   Primary Impression:  


   Chest pain


Disposition:  01 HOME, SELF-CARE


Condition:  Stable





Departure-Patient Inst.


Decision time for Depature:  22:24


 (NII MENCHACA)


Referrals:  


[facility]/ATIF NEAL MD FACP FACC CCDS





NO,LOCAL PHYSICIAN (PCP)


Primary Care Physician


Patient Instructions:  Chest Pain








ATTENDING PHYSICIAN NOTE:


I was physically present as attending physician in the emergency department 

during the care of this patient, but I was not directly involved in the decision

making or delivery of care for this patient. 


 (RYANN LUKE MD)











NII MENCHACA           Jun 9, 2022 20:51


RYANN LUKE MD        Kp 10, 2022 04:10

## 2022-06-09 NOTE — DIAGNOSTIC IMAGING REPORT
INDICATION: Chest pain.



EXAMINATION: Portable chest at 9:14 PM.



Heart size and pulmonary vascularity are normal. Lungs are clear.

There are no effusions or pneumothoraces.



IMPRESSION: No acute abnormalities in the chest. 



Dictated by: 



  Dictated on workstation # [id number]